# Patient Record
Sex: MALE | Race: WHITE | NOT HISPANIC OR LATINO
[De-identification: names, ages, dates, MRNs, and addresses within clinical notes are randomized per-mention and may not be internally consistent; named-entity substitution may affect disease eponyms.]

---

## 2022-07-30 ENCOUNTER — TELEPHONE ENCOUNTER (OUTPATIENT)
Age: 67
End: 2022-07-30

## 2022-07-31 ENCOUNTER — TELEPHONE ENCOUNTER (OUTPATIENT)
Age: 67
End: 2022-07-31

## 2023-07-07 ENCOUNTER — TELEPHONE ENCOUNTER (OUTPATIENT)
Dept: URBAN - METROPOLITAN AREA CLINIC 7 | Facility: CLINIC | Age: 68
End: 2023-07-07

## 2023-07-07 VITALS — HEIGHT: 70 IN | BODY MASS INDEX: 24.34 KG/M2 | WEIGHT: 170 LBS

## 2023-07-31 ENCOUNTER — WEB ENCOUNTER (OUTPATIENT)
Dept: URBAN - METROPOLITAN AREA CLINIC 7 | Facility: CLINIC | Age: 68
End: 2023-07-31

## 2023-07-31 ENCOUNTER — OFFICE VISIT (OUTPATIENT)
Dept: URBAN - METROPOLITAN AREA CLINIC 7 | Facility: CLINIC | Age: 68
End: 2023-07-31
Payer: MEDICARE

## 2023-07-31 ENCOUNTER — LAB OUTSIDE AN ENCOUNTER (OUTPATIENT)
Dept: URBAN - METROPOLITAN AREA CLINIC 7 | Facility: CLINIC | Age: 68
End: 2023-07-31

## 2023-07-31 ENCOUNTER — DASHBOARD ENCOUNTERS (OUTPATIENT)
Age: 68
End: 2023-07-31

## 2023-07-31 VITALS
RESPIRATION RATE: 16 BRPM | BODY MASS INDEX: 25.2 KG/M2 | DIASTOLIC BLOOD PRESSURE: 90 MMHG | HEIGHT: 70 IN | SYSTOLIC BLOOD PRESSURE: 138 MMHG | TEMPERATURE: 97.8 F | WEIGHT: 176 LBS

## 2023-07-31 DIAGNOSIS — Z86.010 HISTORY OF ADENOMATOUS POLYP OF COLON: ICD-10-CM

## 2023-07-31 DIAGNOSIS — R10.32 LEFT GROIN PAIN: ICD-10-CM

## 2023-07-31 PROBLEM — 15629941000119104: Status: ACTIVE | Noted: 2023-07-31

## 2023-07-31 PROCEDURE — 99204 OFFICE O/P NEW MOD 45 MIN: CPT | Performed by: INTERNAL MEDICINE

## 2023-07-31 RX ORDER — ALPRAZOLAM 0.5 MG/1
1 TABLET TABLET ORAL AS NEEDED
Status: ACTIVE | COMMUNITY

## 2023-07-31 NOTE — HPI-TODAY'S VISIT:
68yo M with Left inguinal pain for the past few years and now gettign worse in the past few months. started while lifting a heavy object.  He notes he had a CT of hs abd in 2017 which was reported to be negative for hernia and then had an US in 9/2022 showed some gallstones.  His last colonoscopy was 2018 small adnenoma with Dr. Eldridge.  Patient is seen today with a complaint of abdominal  pain.  The pain is described as ,dul .  It is localized to the ,LLQ into pubic one It is radiating down his leg Exacerbating factors - change in position and lifting Relieving factors -traction of back  Severity mild  No recent fevers or chills No weight loss No change in bowel habits. No dark urine or alcoholic stools. No significant NSAID use history.

## 2023-07-31 NOTE — PHYSICAL EXAM MUSCULOSKELETAL:
Problem: Pain  Goal: #Acceptable pain level achieved/maintained at rest using NRS/Faces  This goal is used for patients who can self-report.  Acceptable means the level is at or below the identified comfort/function goal.   Outcome: Outcome Met, Continue evaluating goal progress toward completion  Pt is able to use the pain scale appropriately, pain is controlled with the current medications provided, will continue to monitor       normal gait and station , no tenderness or deformities present

## 2023-08-09 ENCOUNTER — OFFICE VISIT (OUTPATIENT)
Dept: URBAN - METROPOLITAN AREA SURGERY CENTER 5 | Facility: SURGERY CENTER | Age: 68
End: 2023-08-09
Payer: MEDICARE

## 2023-08-09 DIAGNOSIS — Z86.010 PERSONAL HISTORY OF COLONIC POLYPS: ICD-10-CM

## 2023-08-09 DIAGNOSIS — K64.1 SECOND DEGREE HEMORRHOIDS: ICD-10-CM

## 2023-08-09 DIAGNOSIS — K57.30 DIVERTICULOSIS OF LARGE INTESTINE WITHOUT PERFORATION OR ABSCESS WITHOUT BLEEDING: ICD-10-CM

## 2023-08-09 PROCEDURE — G0105 COLORECTAL SCRN; HI RISK IND: HCPCS | Performed by: INTERNAL MEDICINE

## 2023-08-09 PROCEDURE — 00812 ANES LWR INTST SCR COLSC: CPT | Performed by: NURSE ANESTHETIST, CERTIFIED REGISTERED

## 2023-08-09 PROCEDURE — G0105 COLORECTAL SCRN; HI RISK IND: HCPCS | Performed by: CLINIC/CENTER

## 2023-08-09 RX ORDER — ALPRAZOLAM 0.5 MG/1
1 TABLET TABLET ORAL AS NEEDED
Status: ACTIVE | COMMUNITY

## 2023-08-10 ENCOUNTER — TELEPHONE ENCOUNTER (OUTPATIENT)
Dept: URBAN - METROPOLITAN AREA CLINIC 7 | Facility: CLINIC | Age: 68
End: 2023-08-10

## 2023-08-10 ENCOUNTER — LAB OUTSIDE AN ENCOUNTER (OUTPATIENT)
Dept: URBAN - METROPOLITAN AREA CLINIC 7 | Facility: CLINIC | Age: 68
End: 2023-08-10

## 2023-08-10 PROBLEM — 724538004: Status: ACTIVE | Noted: 2023-08-10

## 2023-08-10 PROBLEM — 300331000: Status: ACTIVE | Noted: 2023-08-10
